# Patient Record
Sex: FEMALE | Race: WHITE | Employment: OTHER | URBAN - METROPOLITAN AREA
[De-identification: names, ages, dates, MRNs, and addresses within clinical notes are randomized per-mention and may not be internally consistent; named-entity substitution may affect disease eponyms.]

---

## 2018-09-18 ENCOUNTER — HOSPITAL ENCOUNTER (EMERGENCY)
Facility: CLINIC | Age: 83
Discharge: HOME | End: 2018-09-18
Attending: EMERGENCY MEDICINE | Admitting: EMERGENCY MEDICINE
Payer: MEDICARE

## 2018-09-18 VITALS
HEART RATE: 81 BPM | SYSTOLIC BLOOD PRESSURE: 142 MMHG | DIASTOLIC BLOOD PRESSURE: 81 MMHG | OXYGEN SATURATION: 100 % | TEMPERATURE: 97.8 F | RESPIRATION RATE: 18 BRPM

## 2018-09-18 DIAGNOSIS — T14.8XXA SKIN AVULSION: ICD-10-CM

## 2018-09-18 PROCEDURE — 99282 EMERGENCY DEPT VISIT SF MDM: CPT

## 2018-09-18 ASSESSMENT — ENCOUNTER SYMPTOMS: WOUND: 1

## 2018-09-18 NOTE — ED PROVIDER NOTES
"  History     Chief Complaint:  Laceration    HPI   Iwona Holland is a 89 year old female on baby aspirin with a history of dementia and minor stroke 15 years ago who presents to the emergency department today with laceration. The patient has a tear on her left arm and daughter believes the dog \"bumped into it\". He did not bite her. Her left forearm has a bruise. Daughter denies other injury or fall. Last tetanus was 2 years ago.  Patient has a history of dementia, and is a limited historian.  She currently lives with daughter, who is present at bedside.    Allergies:  Lidocaine  Penicillins  Sulfa Drugs    Medications:    Aspirin  Aricept  Memantine  Pantoprazole  Trazodone     Past Medical History:    Alzheimers  Dementia  GERD    Past Surgical History:    History reviewed. No pertinent past surgical history.    Family History:    History reviewed. No pertinent family history.     Social History:  The patient was accompanied to the ED by daughter.  Smoking Status: Never  Alcohol Use: No    Marital Status:       Review of Systems   Skin: Positive for wound (left arm laceration and bruise).   All other systems reviewed and are negative.    Physical Exam     Patient Vitals for the past 24 hrs:   BP Temp Temp src Pulse Resp SpO2   09/18/18 0026 142/81 97.8  F (36.6  C) Temporal 81 18 100 %      Physical Exam  General:                        Well-nourished                        Speaking in full sentences  Eyes:                        Conjunctiva without injection or scleral icterus  Resp:                        Lungs CTAB                        No crackles, wheezing or audible rubs                        Good air movement  CV:                                        Normal rate, regular rhythm                        S1 and S2 present                        No murmur, gallop or rub  GI:                        BS present                        Abdomen soft without distention                        Non-tender " to light and deep palpation                        No guarding or rebound tenderness  Skin:                        Approximately 3 cm x 3 cm V-shaped avulsion laceration over dorsum of left upper arm  MSK:                        Moves all extremities                        No focal deformities or swelling  Neuro:                        Alert                        Answers questions appropriately                        Moves all extremities equally  Psych:                        Pleasantly demented     Emergency Department Course   Emergency Department Course:  Nursing notes and vitals reviewed.  0045: I performed an exam of the patient as documented above.   0129:Patient rechecked and updated.    0135: Findings and plan explained to the Patient. Patient discharged home with instructions regarding supportive care, medications, and reasons to return. The importance of close follow-up was reviewed.   I personally answered all related questions prior to discharge.     Impression & Plan    Medical Decision Making:  Iwona Holland is a pleasant 89-year-old female with a history of dementia presenting to the ER accompanied by daughter for evaluation of a left arm laceration.  VS on presentation revealed mildly elevated BP though otherwise are unremarkable.  Exam notable for a avulsion injury over the dorsum of her left arm.  Tetanus status up-to-date.  Remainder of exam reveals no other traumatic injuries.  Wound was thoroughly irrigated and cleansed.  Given the nature of the wound, this was closed using Steri-Strips placed by ERT.  Patient tolerated this without difficulty.  Bandage was applied.  Warning signs discussed which would be suggestive of infection.  Daughter is present at bedside, and well versed in care of her mother.  Patient is not on anticoagulation.  No other injuries are noted.  Return to ER with signs of infection.  All questions answered prior to discharge.    Diagnosis:    ICD-10-CM    1. Skin  avulsion T14.8XXA        Disposition:  discharged to home    Scribe Disclosure:  I, Loulou Venecia, am serving as a scribe at 12:45 AM on 9/18/2018 to document services personally performed by Andrei Lopez MD based on my observations and the provider's statements to me.    9/18/2018   [facility] EMERGENCY DEPARTMENT       Andrei Lopez MD  09/18/18 0202

## 2018-09-18 NOTE — ED NOTES
Pt and POA provided with discharge paperwork and educated on recommended follow-up with PCP for wound recheck. Pt and POA voiced understanding and denied any questions at discharge.

## 2018-09-18 NOTE — ED TRIAGE NOTES
Pt got left forearm stuck and has skin tear.    meds not complete.    Pt A&O x 3, CMS x 3, ABCD's adequate in triage

## 2018-09-18 NOTE — ED AVS SNAPSHOT
[facility] Emergency Department    [address]    [facility] [address]    Phone:  [phone]    Fax:  [phone]                                       Iwona Holland   MRN: [id number]    Department:  [facility] Emergency Department   Date of Visit:  9/18/2018           After Visit Summary Signature Page     I have received my discharge instructions, and my questions have been answered. I have discussed any challenges I see with this plan with the nurse or doctor.    ..........................................................................................................................................  Patient/Patient Representative Signature      ..........................................................................................................................................  Patient Representative Print Name and Relationship to Patient    ..................................................               ................................................  Date                                   Time    ..........................................................................................................................................  Reviewed by Signature/Title    ...................................................              ..............................................  Date                                               Time          22EPIC Rev 08/18

## 2018-09-18 NOTE — ED AVS SNAPSHOT
[facility] Emergency Department    [address]    [facility] [address]    Phone:  [phone]    Fax:  [phone]                                       Iwona Holland   MRN: [id number]    Department:  [facility] Emergency Department   Date of Visit:  9/18/2018           Patient Information     Date Of Birth          4/5/1923        Your diagnoses for this visit were:     Skin avulsion        You were seen by Andrei Lopez MD.      Follow-up Information     Follow up with Bernice Hobbs MD. Schedule an appointment as soon as possible for a visit in 3 days.    Specialty:  Internal Medicine    Why:  For wound re-check    Contact information:    [facility]  [address]  [place] [address]  [phone]          Follow up with [facility] Emergency Department.    Specialty:  EMERGENCY MEDICINE    Why:  If symptoms worsen    Contact information:    [address]  [facility] [id number] [phone]        Discharge Instructions       Discharge Instructions  Laceration (Cut)    You were seen today for a laceration (cut).  Your provider examined your laceration for any problems such a buried foreign body (like glass, a splinter, or gravel), or injury to blood vessels, tendons, and nerves.  Your provider may have also rinsed and/or scrubbed your laceration to help prevent an infection. It may not be possible to find all problems with your laceration on the first visit; occasionally foreign bodies or a tendon injury can go undetected.    Your laceration may have been closed in one of several ways:    No closure: many wounds will heal just fine without closure.    Stitches: regular stitches that require removal.    Staples: skin staples are often used in the scalp/head.    Wound adhesive (glue): skin glue can be used for certain lacerations and doesn t require removal.    Wound strips (aka Butterfly bandages or  steri-strips): these are bandages that help to close a wound.    Absorbable stitches:  dissolving  stitches that go away on their own and usually don t require removal.    A small percentage of wounds will develop an infection regardless of how well the wound is cared for. Antibiotics are generally not indicated to prevent an infection so are only given for a small number of high-risk wounds. Some lacerations are too high risk to close, and are left open to heal because closure can increase the likelihood that an infection will develop.    Remember that all lacerations, no matter how expertly repaired, will cause scarring. We consider many factors, techniques, and materials, in our efforts to provide the best possible cosmetic outcome.    Generally, every Emergency Department visit should have a follow-up clinic visit with either a primary or a specialty clinic/provider. Please follow-up as instructed by your emergency provider today.     Return to the Emergency Department right away if:    You have more redness, swelling, pain, drainage (pus), a bad smell, or red streaking from your laceration as these symptoms could indicate an infection.    You have a fever of 100.4 F or more.    You have bleeding that you cannot stop at home. If your cut starts to bleed, hold pressure on the bleeding area with a clean cloth or put pressure over the bandage.  If the bleeding does not stop after using constant pressure for 30 minutes, you should return to the Emergency Department for further treatment.    An area past the laceration is cool, pale, or blue compared with the other side, or has a slower return of color when squeezed.    Your dressing seems too tight or starts to get uncomfortable or painful. For children, signs of a problem might be irritability or restlessness.    You have loss of normal function or use of an area, such as being unable to straighten or bend a finger normally.    You have a numb area past the  laceration.    Return to the Emergency Department or see your regular provider if:    The laceration starts to come open.     You have something coming out of the cut or a feeling that there is something in the laceration.    Your wound will not heal, or keeps breaking open. There can always be glass, wood, dirt or other things in any wound.  They will not always show up, even on x-rays.  If a wound does not heal, this may be why, and it is important to follow-up with your regular provider.    Home Care:    Take your dressing off in 12-24 hours, or as instructed by your provider, to check your laceration. Remove the dressing sooner if it seems too tight or painful, or if it is getting numb, tingly, or pale past the dressing.    Gently wash your laceration 1-2 times daily with clean water and mild soap. It is okay to shower or run clean water over the laceration, but do not let the laceration soak in water (no swimming).    If your laceration was closed with wound adhesive or strips: pat it dry and leave it open to the air. For all other repairs: after you wash your laceration, or at least 2 times a day, apply antibiotic ointment (such as Neosporin  or Bacitracin ) to the laceration, then cover it with a Band-Aid  or gauze.    Keep the laceration clean. Wear gloves or other protective clothing if you are around dirt.    Follow-up for removal:    If your wound was closed with staples or regular stitches, they need to be removed according to the instructions and timeline specified by your provider today.    If your wound was closed with absorbable ( dissolving ) sutures, they should fall out, dissolve, or not be visible in about one week. If they are still visible, then they should be removed according to the instructions and timeline specified by your provider today.    Scars:  To help minimize scarring:    Wear sunscreen over the healed laceration when out in the sun.    Massage the area regularly once healed.    You  may apply Vitamin E to the healed wound.    Wait. Scars improve in appearance over months and years.    If you were given a prescription for medicine here today, be sure to read all of the information (including the package insert) that comes with your prescription.  This will include important information about the medicine, its side effects, and any warnings that you need to know about.  The pharmacist who fills the prescription can provide more information and answer questions you may have about the medicine.  If you have questions or concerns that the pharmacist cannot address, please call or return to the Emergency Department.       Remember that you can always come back to the Emergency Department if you are not able to see your regular provider in the amount of time listed above, if you get any new symptoms, or if there is anything that worries you.      24 Hour Appointment Hotline       To make an appointment at any [facility], call [phone] ([phone]). If you don't have a family doctor or clinic, we will help you find one. [facility] clinics are conveniently located to serve the needs of you and your family.             Review of your medicines      Our records show that you are taking the medicines listed below. If these are incorrect, please call your family doctor or clinic.        Dose / Directions Last dose taken    ARICEPT PO        Refills:  0        aspirin 81 MG tablet   Dose:  81 mg        Take 81 mg by mouth daily   Refills:  0        MEMANTINE HCL PO        Refills:  0        PANTOPRAZOLE SODIUM PO        Refills:  0        TRAZODONE HCL PO        Refills:  0                Orders Needing Specimen Collection     None      Pending Results     No orders found from 9/16/2018 to 9/19/2018.            Pending Culture Results     No orders found from 9/16/2018 to 9/19/2018.            Pending Results Instructions     If you had any lab results that were not finalized at the time of your  Discharge, you can call the ED Lab Result RN at [phone]. You will be contacted by this team for any positive Lab results or changes in treatment. The nurses are available 7 days a week from 10A to 6:30P.  You can leave a message 24 hours per day and they will return your call.        Test Results From Your Hospital Stay               Clinical Quality Measure: Blood Pressure Screening     Your blood pressure was checked while you were in the emergency department today. The last reading we obtained was  BP: 142/81 . Please read the guidelines below about what these numbers mean and what you should do about them.  If your systolic blood pressure (the top number) is less than 120 and your diastolic blood pressure (the bottom number) is less than 80, then your blood pressure is normal. There is nothing more that you need to do about it.  If your systolic blood pressure (the top number) is 120-139 or your diastolic blood pressure (the bottom number) is 80-89, your blood pressure may be higher than it should be. You should have your blood pressure rechecked within a year by a primary care provider.  If your systolic blood pressure (the top number) is 140 or greater or your diastolic blood pressure (the bottom number) is 90 or greater, you may have high blood pressure. High blood pressure is treatable, but if left untreated over time it can put you at risk for heart attack, stroke, or kidney failure. You should have your blood pressure rechecked by a primary care provider within the next 4 weeks.  If your provider in the emergency department today gave you specific instructions to follow-up with your doctor or provider even sooner than that, you should follow that instruction and not wait for up to 4 weeks for your follow-up visit.        Thank you for choosing [facility]       Thank you for choosing [facility] for your care. Our goal is always to provide you with excellent care. Hearing back from our patients is one way  "we can continue to improve our services. Please take a few minutes to complete the written survey that you may receive in the mail after you visit with us. Thank you!        [facility] Information     [employer] lets you send messages to your doctor, view your test results, renew your prescriptions, schedule appointments and more. To sign up, go to [email or web address] . Click on \"Log in\" on the left side of the screen, which will take you to the Welcome page. Then click on \"Sign up Now\" on the right side of the page.     You will be asked to enter the access code listed below, as well as some personal information. Please follow the directions to create your username and password.     Your access code is: [email or web address]  Expires: 2018  1:36 AM     Your access code will  in 90 days. If you need help or a new code, please call your [facility] clinic or [phone].        Care EveryWhere ID     This is your Care EveryWhere ID. This could be used by other organizations to access your [facility] medical records  [id number]        Equal Access to Services     BRANT TERRAZAS : Hadwarren Izaguirre, tal weber, yanet burgess, nella tabor. So [facility] [phone].    ATENCIÓN: Si habla español, tiene a saba disposición servicios gratuitos de asistencia lingüística. Jovita al [phone].    We comply with applicable federal civil rights laws and [place] laws. We do not discriminate on the basis of race, color, national origin, age, disability, sex, sexual orientation, or gender identity.            After Visit Summary       This is your record. Keep this with you and show to your community pharmacist(s) and doctor(s) at your next visit.                  "

## 2021-05-24 ENCOUNTER — RECORDS - HEALTHEAST (OUTPATIENT)
Dept: ADMINISTRATIVE | Facility: CLINIC | Age: 86
End: 2021-05-24

## 2021-05-27 ENCOUNTER — RECORDS - HEALTHEAST (OUTPATIENT)
Dept: ADMINISTRATIVE | Facility: CLINIC | Age: 86
End: 2021-05-27